# Patient Record
Sex: FEMALE | Race: WHITE | URBAN - METROPOLITAN AREA
[De-identification: names, ages, dates, MRNs, and addresses within clinical notes are randomized per-mention and may not be internally consistent; named-entity substitution may affect disease eponyms.]

---

## 2024-10-21 ENCOUNTER — LAB ENCOUNTER (OUTPATIENT)
Dept: LAB | Facility: HOSPITAL | Age: 59
End: 2024-10-21
Attending: INTERNAL MEDICINE
Payer: MEDICARE

## 2024-10-21 ENCOUNTER — OFFICE VISIT (OUTPATIENT)
Dept: ENDOCRINOLOGY CLINIC | Facility: CLINIC | Age: 59
End: 2024-10-21
Payer: MEDICARE

## 2024-10-21 ENCOUNTER — TELEPHONE (OUTPATIENT)
Dept: ENDOCRINOLOGY CLINIC | Facility: CLINIC | Age: 59
End: 2024-10-21

## 2024-10-21 VITALS
BODY MASS INDEX: 45.99 KG/M2 | WEIGHT: 293 LBS | HEIGHT: 67 IN | DIASTOLIC BLOOD PRESSURE: 79 MMHG | HEART RATE: 69 BPM | SYSTOLIC BLOOD PRESSURE: 131 MMHG

## 2024-10-21 DIAGNOSIS — E07.9 THYROID DISEASE: Primary | ICD-10-CM

## 2024-10-21 DIAGNOSIS — M34.9 SCLERODERMA (HCC): ICD-10-CM

## 2024-10-21 DIAGNOSIS — E03.9 ACQUIRED HYPOTHYROIDISM: ICD-10-CM

## 2024-10-21 DIAGNOSIS — K21.9 GASTROESOPHAGEAL REFLUX DISEASE, UNSPECIFIED WHETHER ESOPHAGITIS PRESENT: ICD-10-CM

## 2024-10-21 DIAGNOSIS — G47.33 OSA (OBSTRUCTIVE SLEEP APNEA): ICD-10-CM

## 2024-10-21 DIAGNOSIS — M79.7 FIBROMYALGIA: ICD-10-CM

## 2024-10-21 DIAGNOSIS — I73.00 RAYNAUD'S PHENOMENON WITHOUT GANGRENE: ICD-10-CM

## 2024-10-21 DIAGNOSIS — M35.9 AUTOIMMUNE DISEASE (HCC): ICD-10-CM

## 2024-10-21 DIAGNOSIS — E66.01 CLASS 3 SEVERE OBESITY WITH BODY MASS INDEX (BMI) OF 50.0 TO 59.9 IN ADULT, UNSPECIFIED OBESITY TYPE, UNSPECIFIED WHETHER SERIOUS COMORBIDITY PRESENT (HCC): ICD-10-CM

## 2024-10-21 DIAGNOSIS — E66.813 CLASS 3 SEVERE OBESITY WITH BODY MASS INDEX (BMI) OF 50.0 TO 59.9 IN ADULT, UNSPECIFIED OBESITY TYPE, UNSPECIFIED WHETHER SERIOUS COMORBIDITY PRESENT (HCC): ICD-10-CM

## 2024-10-21 LAB
T4 FREE SERPL-MCNC: 1.3 NG/DL (ref 0.8–1.7)
THYROPEROXIDASE AB SERPL-ACNC: 55 U/ML (ref ?–60)
TSI SER-ACNC: 2.62 MIU/ML (ref 0.55–4.78)

## 2024-10-21 PROCEDURE — 86376 MICROSOMAL ANTIBODY EACH: CPT | Performed by: INTERNAL MEDICINE

## 2024-10-21 PROCEDURE — 36415 COLL VENOUS BLD VENIPUNCTURE: CPT | Performed by: INTERNAL MEDICINE

## 2024-10-21 PROCEDURE — 84443 ASSAY THYROID STIM HORMONE: CPT | Performed by: INTERNAL MEDICINE

## 2024-10-21 PROCEDURE — 84439 ASSAY OF FREE THYROXINE: CPT | Performed by: INTERNAL MEDICINE

## 2024-10-21 RX ORDER — LEVOTHYROXINE SODIUM 50 UG/1
1 TABLET ORAL DAILY
COMMUNITY
End: 2024-10-21

## 2024-10-21 RX ORDER — PREDNISONE 1 MG/1
1 TABLET ORAL DAILY
COMMUNITY
Start: 1900-01-01

## 2024-10-21 RX ORDER — LEVOTHYROXINE SODIUM 50 UG/1
50 TABLET ORAL DAILY
Qty: 90 TABLET | Refills: 0 | Status: SHIPPED | OUTPATIENT
Start: 2024-12-18

## 2024-10-21 RX ORDER — PAROXETINE 40 MG/1
1 TABLET, FILM COATED ORAL DAILY
COMMUNITY

## 2024-10-21 RX ORDER — ALPRAZOLAM 0.25 MG/1
0.25 TABLET ORAL NIGHTLY PRN
COMMUNITY

## 2024-10-21 RX ORDER — TRAZODONE HYDROCHLORIDE 150 MG/1
1 TABLET ORAL DAILY
COMMUNITY

## 2024-10-21 RX ORDER — ESOMEPRAZOLE MAGNESIUM 40 MG/1
40 CAPSULE, DELAYED RELEASE ORAL 2 TIMES DAILY
COMMUNITY

## 2024-10-21 RX ORDER — MONTELUKAST SODIUM 10 MG/1
1 TABLET ORAL DAILY
COMMUNITY
Start: 1900-01-01

## 2024-10-21 NOTE — PROGRESS NOTES
New Patient Evaluation - History and Physical    CONSULT - Reason for Visit:    thyroid disease   Requesting Physician:   .JESSICA MCKNIGHT  No referring provider defined for this encounter.    CHIEF COMPLAINT:    Chief Complaint   Patient presents with    Thyroid Problem     consult        HISTORY OF PRESENT ILLNESS:   Nela Pena is a 59 year old female who presents with   thyroid disease   Wt gain 100 lb in 1-2 yrs   Labs were done in winter and repeated 8-9/2024  Has OA, scleroderma, ANGEL, FMR  She is on GF diet for ~ 2 weeks   Lost 15 lb with diet changes   Stopped diary   3-4/2024 Started LT4 50 mcg/night . Takes it w/ trazodone     Feels better now after changed her diet   Not as tired.   Does not need naps  Has chronic cough   Has sweating and got worse with thyroid med   Had complete HYS 15 yrs ago.       Compression symptoms: denied except the underlined ones SOB, dysphagia, odynophagia, change in voice, hoarseness, neck pain or neck mass.     The patient endorses the bolded symptoms: intolerance to cold, constipation, decreased lacrimation, fatigue; anxiety, heat intolerance, insomnia, tremors, wt loss, wt gain, irregular menses, lid lag, palpitations, proptosis, thinning hair; dyspnea, difficulty breathing when lying down, dysphagia, sensation of food getting stuck in the throat, choking sensation when lying flat, pooling of saliva, dysphonia, voice changes, hoarseness; none.    No eye or vision changes.   No heat or cold intolerance  Hair and skin:no  Menstrual hx: No LMP recorded. Post HYS    Neck surgery: No  Neck radiation: No   Biotin: no  Turmeric:no  Family history of thyroid cancer in 1st degree relatives: no           ASSESSMENT AND PLAN:  Nela Pena is a 59 year old female who presents with      Plan  Thyroid medication dose: Levothyroxine   Take you medication on empty stomach, not with any other medication or food. Wait 60 minutes before eating. Wait 4 hours before taking Vitamins,  Calcium or iron. On the morning of the lab test, please take the medication after the blood test not before. Do not take Biotin 1 week before blood test.           [email or web address]    Don’t take your thyroid hormone at the same time as:  Walnuts.  Soybean flour.  Cottonseed meal.  Iron supplements or multivitamins containing iron.  Calcium supplements.  Antacids that contain aluminum, magnesium or calcium.  Some ulcer medicines, such as sucralfate (Carafate).  Some cholesterol-lowering drugs, such as those containing cholestyramine (Prevalite, Locholest) and colestipol (Colestid).  To avoid possible problems, eat these foods or use these products several hours before or after you take your thyroid medicine.    Supplements containing biotin, common in hair and nail products, can make it hard to measure how much thyroid hormone is in the body. Biotin does not affect thyroid hormone levels. But supplements that have biotin should be stopped for at least a week before measuring thyroid function so that the measurement is correct.    PAST MEDICAL HISTORY:   History reviewed. No pertinent past medical history.  GERD. ANGEL   Scleroderma  Raynaud  FMR  Psoriasis      PAST SURGICAL HISTORY:   History reviewed. No pertinent surgical history.  HYS  Ankle  Rt 4th metatarsal fracture w/o trauma     CURRENT MEDICATIONS:     Esomeprazole Magnesium 40 MG Oral Capsule Delayed Release Take 1 capsule (40 mg total) by mouth 2 (two) times daily.      montelukast 10 MG Oral Tab Take 1 tablet (10 mg total) by mouth daily.      PARoxetine HCl 40 MG Oral Tab Take 1 tablet (40 mg total) by mouth daily.      predniSONE 1 MG Oral Tab Take 1 tablet (1 mg total) by mouth daily.      traZODone 150 MG Oral Tab Take 1 tablet (150 mg total) by mouth daily.      [START ON 12/18/2024] levothyroxine 50 MCG Oral Tab Take 1 tablet (50 mcg total) by mouth daily. 90 tablet 0       ALLERGIES:  Allergies[1]  sulfa  SOCIAL  HISTORY:    Social History     Socioeconomic History    Marital status:    Disabled    part time   Smoking no  Marijuana no  Etoh no  Drugs no  FAMILY HISTORY:   History reviewed. No pertinent family history.   Celiac dz  Bone cancer and leukemia father  REVIEW OF SYSTEMS:  All negative other than HPI    PHYSICAL EXAM:   Height: 5' 7\" (170.2 cm) (10/21 1607)  Weight: 328 lb (148.8 kg) (10/21 1607)  BSA (Calculated - sq m): 2.5 sq meters (10/21 1607)  Pulse: 69 (10/21 1607)  BP: 131/79 (10/21 1607)  Temp: --  Do Not Use - Resp Rate: --  SpO2: --    Body mass index is 51.37 kg/m².  Denied striae   No goiter   No tremors     CONSTITUTIONAL:  Awake and alert. Age appropriate, good hygiene not in acute distress. Well-nourished and well developed. no acute distress   PSYCH:   Orientated to time, place, person & situation, Normal mood and affect, memory intact, normal insight and judgment, cooperative  Neuro: speech is clear. Awake, alert, no aphasia, no facial asymmetry, no nuchal rigidity  EYES:  No proptosis, no ptosis, conjunctiva normal  ENT:  Normocephalic, atraumatic  Eye: EOMI, normal lids, no discharge, no conjunctival erythema. No exophthalmos/proptosis, Ptosis negative   No rhinorrhea, moist oral mucosa  Neck: full range of motion  Neck/Thyroid: neck inspection: normal, No scar, No goiter   LUNGS:  No acute respiratory distress, non-labored respiration. Speaking full sentences  CARDIOVASCULAR:  regular rate   ABDOMEN:  No abdominal pain.   SKIN:  no bruising or bleeding, no rashes and no lesions, Skin is dry, no obvious rashes or lesions  EXTREMITIES: no gross abnormality   MSK: Moves extremities spontaneously. full range of motion in all major joints      DATA:     Pertinent data reviewed  TSH 2.3 6/2024   No results found.    No results for input(s): \"TSH\", \"T4F\", \"T3F\", \"THYP\" in the last 72 hours.  No results found for: \"TSH\"  No results found for: \"A1C\"        No results for input(s):  \"TSH\", \"T4F\", \"T3F\", \"THYP\" in the last 72 hours.  No results found.    Orders Placed This Encounter   Procedures    Thyroid Peroxidase (TPO) AB    TSH and Free T4    TSH and Free T4     Orders Placed This Encounter    Thyroid Peroxidase (TPO) AB     Order Specific Question:   Release to patient     Answer:   Immediate    TSH and Free T4     Order Specific Question:   Release to patient     Answer:   Immediate    TSH and Free T4     Standing Status:   Future     Standing Expiration Date:   10/21/2025     Order Specific Question:   Release to patient     Answer:   Immediate    ALPRAZolam 0.25 MG Oral Tab     Sig: Take 1 tablet (0.25 mg total) by mouth nightly as needed.    Esomeprazole Magnesium 40 MG Oral Capsule Delayed Release     Sig: Take 1 capsule (40 mg total) by mouth 2 (two) times daily.    montelukast 10 MG Oral Tab     Sig: Take 1 tablet (10 mg total) by mouth daily.    PARoxetine HCl 40 MG Oral Tab     Sig: Take 1 tablet (40 mg total) by mouth daily.    predniSONE 1 MG Oral Tab     Sig: Take 1 tablet (1 mg total) by mouth daily.    traZODone 150 MG Oral Tab     Sig: Take 1 tablet (150 mg total) by mouth daily.    DISCONTD: levothyroxine 50 MCG Oral Tab     Sig: Take 1 tablet (50 mcg total) by mouth daily.    levothyroxine 50 MCG Oral Tab     Sig: Take 1 tablet (50 mcg total) by mouth daily.     Dispense:  90 tablet     Refill:  0          This is a specialized patient consultation in endocrinology and required comprehensive review of prior records, as well as current evaluation, with time required for consideration of complex endocrine issues and consultation. For this visit, I personally interviewed the patient, and family member if accompanied, performed the pertinent parts of the history and physical examination. ROS included screening for appropriate endocrine conditions.   Today's diagnosis and plan were reviewed in detail with the patient who states understanding and agrees with plan. I discussed  with the patient possible diagnosis, differential diagnosis, need for work up, treatment options, alternatives and side effects.     Please see note for details about time spent which includes:   · pre-visit preparation  · reviewing records  · face to face time with the patient   · timely documentation of the encounter  · ordering medications/tests  · communication with care team  · care coordination    I appreciate the opportunity to be part of your patient's medical care and will keep you, as the referring and primary physicians, informed about the care of your patient. Please feel free to contact me should you have any questions.    The 21st Century Cures Act makes medical notes like these available to patients in the interest of transparency. Please be advised this is a medical document. Medical documents are intended to carry relevant information, facts as evident, and the clinical opinion of the practitioner. The medical note is intended as peer to peer communication and may appear blunt or direct. It is written in medical language and may contain abbreviations or verbiage that are unfamiliar.   Arabella Patel MD              [1]   Allergies  Allergen Reactions    Sulfa Antibiotics PAIN

## 2024-10-21 NOTE — TELEPHONE ENCOUNTER
Received fax from [facility] dated on 9/11/2024. MD reviewed and signed. Sent to scanning.

## 2024-10-21 NOTE — PATIENT INSTRUCTIONS
Labs today   Labs and RTC in 3 mo   Thyroid medication dose: Levothyroxine 50 mcg /night   Take you medication on empty stomach, not with any other medication or food. Wait 60 minutes before eating. Wait 4 hours before taking Vitamins, Calcium or iron. On the morning of the lab test, please take the medication after the blood test not before. Do not take Biotin 1 week before blood test.           [email or web address]    Don’t take your thyroid hormone at the same time as:  Walnuts.  Soybean flour.  Cottonseed meal.  Iron supplements or multivitamins containing iron.  Calcium supplements.  Antacids that contain aluminum, magnesium or calcium.  Some ulcer medicines, such as sucralfate (Carafate).  Some cholesterol-lowering drugs, such as those containing cholestyramine (Prevalite, Locholest) and colestipol (Colestid).  To avoid possible problems, eat these foods or use these products several hours before or after you take your thyroid medicine.    Supplements containing biotin, common in hair and nail products, can make it hard to measure how much thyroid hormone is in the body. Biotin does not affect thyroid hormone levels. But supplements that have biotin should be stopped for at least a week before measuring thyroid function so that the measurement is correct.

## 2025-01-24 ENCOUNTER — LAB ENCOUNTER (OUTPATIENT)
Dept: LAB | Facility: HOSPITAL | Age: 60
End: 2025-01-24
Attending: INTERNAL MEDICINE
Payer: MEDICARE

## 2025-01-24 ENCOUNTER — OFFICE VISIT (OUTPATIENT)
Facility: CLINIC | Age: 60
End: 2025-01-24
Payer: MEDICARE

## 2025-01-24 VITALS
DIASTOLIC BLOOD PRESSURE: 74 MMHG | SYSTOLIC BLOOD PRESSURE: 112 MMHG | WEIGHT: 293 LBS | BODY MASS INDEX: 47.09 KG/M2 | HEART RATE: 75 BPM | HEIGHT: 66 IN

## 2025-01-24 DIAGNOSIS — M79.7 FIBROMYALGIA: ICD-10-CM

## 2025-01-24 DIAGNOSIS — E66.813 CLASS 3 SEVERE OBESITY WITH BODY MASS INDEX (BMI) OF 50.0 TO 59.9 IN ADULT, UNSPECIFIED OBESITY TYPE, UNSPECIFIED WHETHER SERIOUS COMORBIDITY PRESENT (HCC): ICD-10-CM

## 2025-01-24 DIAGNOSIS — M35.9 AUTOIMMUNE DISEASE (HCC): ICD-10-CM

## 2025-01-24 DIAGNOSIS — E07.9 THYROID DISEASE: ICD-10-CM

## 2025-01-24 DIAGNOSIS — E03.9 ACQUIRED HYPOTHYROIDISM: Primary | ICD-10-CM

## 2025-01-24 DIAGNOSIS — E66.01 CLASS 3 SEVERE OBESITY WITH BODY MASS INDEX (BMI) OF 50.0 TO 59.9 IN ADULT, UNSPECIFIED OBESITY TYPE, UNSPECIFIED WHETHER SERIOUS COMORBIDITY PRESENT (HCC): ICD-10-CM

## 2025-01-24 DIAGNOSIS — G47.33 OSA (OBSTRUCTIVE SLEEP APNEA): ICD-10-CM

## 2025-01-24 DIAGNOSIS — K21.9 GASTROESOPHAGEAL REFLUX DISEASE, UNSPECIFIED WHETHER ESOPHAGITIS PRESENT: ICD-10-CM

## 2025-01-24 DIAGNOSIS — M34.9 SCLERODERMA (HCC): ICD-10-CM

## 2025-01-24 DIAGNOSIS — I73.00 RAYNAUD'S PHENOMENON WITHOUT GANGRENE: ICD-10-CM

## 2025-01-24 LAB
T4 FREE SERPL-MCNC: 1.2 NG/DL (ref 0.8–1.7)
TSI SER-ACNC: 2.35 UIU/ML (ref 0.55–4.78)

## 2025-01-24 PROCEDURE — 3078F DIAST BP <80 MM HG: CPT | Performed by: INTERNAL MEDICINE

## 2025-01-24 PROCEDURE — 3008F BODY MASS INDEX DOCD: CPT | Performed by: INTERNAL MEDICINE

## 2025-01-24 PROCEDURE — 84439 ASSAY OF FREE THYROXINE: CPT | Performed by: INTERNAL MEDICINE

## 2025-01-24 PROCEDURE — 3074F SYST BP LT 130 MM HG: CPT | Performed by: INTERNAL MEDICINE

## 2025-01-24 PROCEDURE — 99214 OFFICE O/P EST MOD 30 MIN: CPT | Performed by: INTERNAL MEDICINE

## 2025-01-24 PROCEDURE — 84443 ASSAY THYROID STIM HORMONE: CPT | Performed by: INTERNAL MEDICINE

## 2025-01-24 PROCEDURE — 36415 COLL VENOUS BLD VENIPUNCTURE: CPT | Performed by: INTERNAL MEDICINE

## 2025-01-24 RX ORDER — LEVOTHYROXINE SODIUM 50 UG/1
50 TABLET ORAL DAILY
Qty: 90 TABLET | Refills: 0 | Status: SHIPPED | OUTPATIENT
Start: 2025-01-24

## 2025-01-24 NOTE — PROGRESS NOTES
Reason for Visit:    thyroid disease   Requesting Physician:   ..BENJAMIN MCKNIGHT  No referring provider defined for this encounter.    CHIEF COMPLAINT:    Chief Complaint   Patient presents with    Thyroid Problem     F/u     HISTORY OF PRESENT ILLNESS:   Nela Pena is a 59 year old female who presents with   hypothyroidism   Wt gain 100 lb in 1-2 yrs and gained 10 lbs in the last ~ 2 mo   She was on phentermine   Does not want to be on GLP-1 d/t fear of SE.     Takes her thyroid meds as rec'   We discussed her lab result TSH/FT4/TPO       Has OA, scleroderma, ANGEL, FMR  She is on GF diet for ~ 2 weeks      On  LT4 50 mcg/night  Now  takes it  as rec'   Denied hypo or hyperthyroid sx     Had complete HYS 15 yrs ago.     Menstrual hx: No LMP recorded. Post HYS    Neck surgery: No  Neck radiation: No   Biotin: no  Turmeric:no  Family history of thyroid cancer in 1st degree relatives: no         Wt Readings from Last 6 Encounters:   01/24/25 (!) 338 lb (153.3 kg)   10/21/24 (!) 328 lb (148.8 kg)       ASSESSMENT AND PLAN:  Nela Pena is a 59 year old female who presents with  hypothyroidism   She is on LT4 50 mcg/day   Clinically , nonspecific symptoms.   She has other autoimmune diseases so her symptoms are multifactorial     Normal TFT   Plan  Labs today and will reassess thyroid dose. If will be on the same dose of levothyroxine, will get refills from PCP and follow up with endocrine as needed    Update, TSH and FT4 are normal continue same doses and follow up with PCP     Will refer to wt loss medicine   Thyroid medication dose: Levothyroxine 50 mcg/day   Thyroid medication dose: Levothyroxine 50 mcg /night Take you medication on empty stomach, not with any other medication or food. Wait 60 minutes before eating. Wait 4 hours before taking Vitamins, Calcium or iron. On the morning of the lab test, please take the medication after the blood test not before. Do not take Biotin 1 week before blood test.            [email or web address]    Don’t take your thyroid hormone at the same time as:  Walnuts.  Soybean flour.  Cottonseed meal.  Iron supplements or multivitamins containing iron.  Calcium supplements.  Antacids that contain aluminum, magnesium or calcium.  Some ulcer medicines, such as sucralfate (Carafate).  Some cholesterol-lowering drugs, such as those containing cholestyramine (Prevalite, Locholest) and colestipol (Colestid).  To avoid possible problems, eat these foods or use these products several hours before or after you take your thyroid medicine.    Supplements containing biotin, common in hair and nail products, can make it hard to measure how much thyroid hormone is in the body. Biotin does not affect thyroid hormone levels. But supplements that have biotin should be stopped for at least a week before measuring thyroid function so that the measurement is correct.    PAST MEDICAL HISTORY:   History reviewed. No pertinent past medical history.  GERD. ANGEL   Scleroderma  Raynaud  FMR  Psoriasis      PAST SURGICAL HISTORY:   History reviewed. No pertinent surgical history.  HYS  Ankle  Rt 4th metatarsal fracture w/o trauma     CURRENT MEDICATIONS:     levothyroxine 50 MCG Oral Tab Take 1 tablet (50 mcg total) by mouth daily. 90 tablet 0       ALLERGIES:  Allergies[1]  sulfa  SOCIAL HISTORY:    Social History     Socioeconomic History    Marital status:    Tobacco Use    Smoking status: Never    Smokeless tobacco: Never   Substance and Sexual Activity    Alcohol use: Not Currently     Comment: 4/2/2023   Disabled    part time   Smoking no  Marijuana no  Etoh no  Drugs no  FAMILY HISTORY:   History reviewed. No pertinent family history.   Celiac dz  Bone cancer and leukemia father    PHYSICAL EXAM:   Height: 5' 6\" (167.6 cm) (01/24 1524)  Weight: 338 lb (153.3 kg) (01/24 1524)  BSA (Calculated - sq m): 2.5 sq meters (01/24 1524)  Pulse: 75 (01/24  1524)  BP: 112/74 (01/24 1524)  Temp: --  Do Not Use - Resp Rate: --  SpO2: --    Body mass index is 54.55 kg/m².  Denied striae   No tremors           DATA:     Pertinent data reviewed  TSH 2.3 6/2024    Latest Reference Range & Units 10/21/24 17:04   T4,Free (Direct) 0.8 - 1.7 ng/dL 1.3   TSH 0.550 - 4.780 mIU/mL 2.615   ANTI-THYROPEROXIDASE <60 U/mL 55     No results found.      Recent Labs     01/24/25  1623   TSH 2.347   T4F 1.2       TSH   Date Value Ref Range Status   01/24/2025 2.347 0.550 - 4.780 uIU/mL Final     No results found for: \"A1C\"        Recent Labs     01/24/25  1623   TSH 2.347   T4F 1.2       No results found.  TSH and Free T4        Component Value Flag Ref Range Units Status    Free T4 1.2      0.8 - 1.7 ng/dL Final    Comment:    If applicable: Pregnancy Reference Intervals  First trimester 10-13 weeks gestation    0.9-1.4 ng/dL  Second trimester 14-26 weeks gestation   0.7-1.3 ng/dL        TSH 2.347      0.550 - 4.780 uIU/mL Final                    Orders Placed This Encounter   Procedures    TSH and Free T4     Orders Placed This Encounter    TSH and Free T4     Order Specific Question:   Release to patient     Answer:   Immediate    levothyroxine 50 MCG Oral Tab     Sig: Take 1 tablet (50 mcg total) by mouth daily.     Dispense:  90 tablet     Refill:  0          This is a specialized patient consultation in endocrinology and required comprehensive review of prior records, as well as current evaluation, with time required for consideration of complex endocrine issues and consultation. For this visit, I personally interviewed the patient, and family member if accompanied, performed the pertinent parts of the history and physical examination. ROS included screening for appropriate endocrine conditions.   Today's diagnosis and plan were reviewed in detail with the patient who states understanding and agrees with plan. I discussed with the patient possible diagnosis, differential diagnosis,  need for work up, treatment options, alternatives and side effects.     Please see note for details about time spent which includes:   · pre-visit preparation  · reviewing records  · face to face time with the patient   · timely documentation of the encounter  · ordering medications/tests  · communication with care team  · care coordination    I appreciate the opportunity to be part of your patient's medical care and will keep you, as the referring and primary physicians, informed about the care of your patient. Please feel free to contact me should you have any questions.    The 21st Century Cures Act makes medical notes like these available to patients in the interest of transparency. Please be advised this is a medical document. Medical documents are intended to carry relevant information, facts as evident, and the clinical opinion of the practitioner. The medical note is intended as peer to peer communication and may appear blunt or direct. It is written in medical language and may contain abbreviations or verbiage that are unfamiliar.   Arabella Patel MD              [1]   Allergies  Allergen Reactions    Sulfa Antibiotics PAIN

## 2025-01-24 NOTE — PATIENT INSTRUCTIONS
Labs today and will reassess thyroid dose. If will be on the same dose of levothyroxine, will get refills from PCP and follow up with endocrine as needed      Will refer to wt loss medicine   Thyroid medication dose: Levothyroxine 50 mcg/day   Take you medication on empty stomach, not with any other medication or food. Wait 60 minutes before eating. Wait 4 hours before taking Vitamins, Calcium or iron. On the morning of the lab test, please take the medication after the blood test not before. Do not take Biotin 1 week before blood test.           [email or web address]    Don’t take your thyroid hormone at the same time as:  Walnuts.  Soybean flour.  Cottonseed meal.  Iron supplements or multivitamins containing iron.  Calcium supplements.  Antacids that contain aluminum, magnesium or calcium.  Some ulcer medicines, such as sucralfate (Carafate).  Some cholesterol-lowering drugs, such as those containing cholestyramine (Prevalite, Locholest) and colestipol (Colestid).  To avoid possible problems, eat these foods or use these products several hours before or after you take your thyroid medicine.    Supplements containing biotin, common in hair and nail products, can make it hard to measure how much thyroid hormone is in the body. Biotin does not affect thyroid hormone levels. But supplements that have biotin should be stopped for at least a week before measuring thyroid function so that the measurement is correct.

## 2025-07-01 RX ORDER — LEVOTHYROXINE SODIUM 50 UG/1
50 TABLET ORAL DAILY
Qty: 90 TABLET | Refills: 0 | Status: SHIPPED | OUTPATIENT
Start: 2025-07-01

## 2025-07-01 RX ORDER — LEVOTHYROXINE SODIUM 50 UG/1
50 TABLET ORAL DAILY
Qty: 90 TABLET | Refills: 0 | Status: SHIPPED | OUTPATIENT
Start: 2025-07-01 | End: 2025-07-01

## 2025-07-01 NOTE — TELEPHONE ENCOUNTER
Endocrine refill protocol for medications for hypothyroidism and hyperthyroidism    Protocol Criteria:  PASSED Reason: N/A    If all below requirements are met, send a 90-day supply with 1 refill per provider protocol.    Verify appointment with Endocrinology completed in the last 12 months or scheduled in the next 6 months.    Normal TSH result in the past 12 months   Review recent telephone encounters and mychart communications with patient to ensure a dose change has not occurred since last office visit that was not updated in the medication history list     Last completed office visit:1/24/2025 Arabella Patel MD   Last completed telemed visit: Visit date not found  Next scheduled Follow up: No future appointments.   Last TSH result:   TSH   Date Value Ref Range Status   01/24/2025 2.347 0.550 - 4.780 uIU/mL Final